# Patient Record
Sex: FEMALE | Race: BLACK OR AFRICAN AMERICAN | Employment: UNEMPLOYED | URBAN - METROPOLITAN AREA
[De-identification: names, ages, dates, MRNs, and addresses within clinical notes are randomized per-mention and may not be internally consistent; named-entity substitution may affect disease eponyms.]

---

## 2022-03-14 ENCOUNTER — APPOINTMENT (OUTPATIENT)
Dept: GENERAL RADIOLOGY | Age: 47
End: 2022-03-14
Payer: COMMERCIAL

## 2022-03-14 ENCOUNTER — APPOINTMENT (OUTPATIENT)
Dept: CT IMAGING | Age: 47
End: 2022-03-14
Payer: COMMERCIAL

## 2022-03-14 ENCOUNTER — HOSPITAL ENCOUNTER (OUTPATIENT)
Age: 47
Setting detail: OBSERVATION
Discharge: HOME | End: 2022-03-16
Attending: EMERGENCY MEDICINE | Admitting: INTERNAL MEDICINE
Payer: COMMERCIAL

## 2022-03-14 DIAGNOSIS — R07.9 ACUTE CHEST PAIN: ICD-10-CM

## 2022-03-14 DIAGNOSIS — I10 HYPERTENSION, UNSPECIFIED TYPE: ICD-10-CM

## 2022-03-14 DIAGNOSIS — E55.9 VITAMIN D DEFICIENCY: ICD-10-CM

## 2022-03-14 DIAGNOSIS — G45.9 TIA (TRANSIENT ISCHEMIC ATTACK): Primary | ICD-10-CM

## 2022-03-14 LAB
ANION GAP SERPL CALCULATED.3IONS-SCNC: 10 MMOL/L (ref 7–16)
BASOPHILS ABSOLUTE: 0.02 E9/L (ref 0–0.2)
BASOPHILS RELATIVE PERCENT: 0.4 % (ref 0–2)
BUN BLDV-MCNC: 10 MG/DL (ref 6–20)
CALCIUM SERPL-MCNC: 9.1 MG/DL (ref 8.6–10.2)
CHLORIDE BLD-SCNC: 101 MMOL/L (ref 98–107)
CO2: 27 MMOL/L (ref 22–29)
CREAT SERPL-MCNC: 0.9 MG/DL (ref 0.5–1)
EKG ATRIAL RATE: 67 BPM
EKG P AXIS: 18 DEGREES
EKG P-R INTERVAL: 194 MS
EKG Q-T INTERVAL: 404 MS
EKG QRS DURATION: 76 MS
EKG QTC CALCULATION (BAZETT): 426 MS
EKG R AXIS: 39 DEGREES
EKG T AXIS: 25 DEGREES
EKG VENTRICULAR RATE: 67 BPM
EOSINOPHILS ABSOLUTE: 0.1 E9/L (ref 0.05–0.5)
EOSINOPHILS RELATIVE PERCENT: 2.1 % (ref 0–6)
GFR AFRICAN AMERICAN: >60
GFR NON-AFRICAN AMERICAN: >60 ML/MIN/1.73
GLUCOSE BLD-MCNC: 93 MG/DL (ref 74–99)
HCG, URINE, POC: NEGATIVE
HCT VFR BLD CALC: 40.8 % (ref 34–48)
HEMOGLOBIN: 13.4 G/DL (ref 11.5–15.5)
IMMATURE GRANULOCYTES #: 0.02 E9/L
IMMATURE GRANULOCYTES %: 0.4 % (ref 0–5)
LYMPHOCYTES ABSOLUTE: 1.69 E9/L (ref 1.5–4)
LYMPHOCYTES RELATIVE PERCENT: 34.8 % (ref 20–42)
Lab: NORMAL
MCH RBC QN AUTO: 30.4 PG (ref 26–35)
MCHC RBC AUTO-ENTMCNC: 32.8 % (ref 32–34.5)
MCV RBC AUTO: 92.5 FL (ref 80–99.9)
MONOCYTES ABSOLUTE: 0.48 E9/L (ref 0.1–0.95)
MONOCYTES RELATIVE PERCENT: 9.9 % (ref 2–12)
NEGATIVE QC PASS/FAIL: NORMAL
NEUTROPHILS ABSOLUTE: 2.55 E9/L (ref 1.8–7.3)
NEUTROPHILS RELATIVE PERCENT: 52.4 % (ref 43–80)
PDW BLD-RTO: 13.4 FL (ref 11.5–15)
PLATELET # BLD: 204 E9/L (ref 130–450)
PMV BLD AUTO: 10.7 FL (ref 7–12)
POSITIVE QC PASS/FAIL: NORMAL
POTASSIUM REFLEX MAGNESIUM: 4.1 MMOL/L (ref 3.5–5)
RBC # BLD: 4.41 E12/L (ref 3.5–5.5)
SODIUM BLD-SCNC: 138 MMOL/L (ref 132–146)
TROPONIN, HIGH SENSITIVITY: <6 NG/L (ref 0–9)
WBC # BLD: 4.9 E9/L (ref 4.5–11.5)

## 2022-03-14 PROCEDURE — 80048 BASIC METABOLIC PNL TOTAL CA: CPT

## 2022-03-14 PROCEDURE — 93005 ELECTROCARDIOGRAM TRACING: CPT | Performed by: NURSE PRACTITIONER

## 2022-03-14 PROCEDURE — 85025 COMPLETE CBC W/AUTO DIFF WBC: CPT

## 2022-03-14 PROCEDURE — G0378 HOSPITAL OBSERVATION PER HR: HCPCS

## 2022-03-14 PROCEDURE — 99283 EMERGENCY DEPT VISIT LOW MDM: CPT

## 2022-03-14 PROCEDURE — 2580000003 HC RX 258: Performed by: INTERNAL MEDICINE

## 2022-03-14 PROCEDURE — 70450 CT HEAD/BRAIN W/O DYE: CPT

## 2022-03-14 PROCEDURE — 93010 ELECTROCARDIOGRAM REPORT: CPT | Performed by: INTERNAL MEDICINE

## 2022-03-14 PROCEDURE — 84484 ASSAY OF TROPONIN QUANT: CPT

## 2022-03-14 PROCEDURE — 6370000000 HC RX 637 (ALT 250 FOR IP): Performed by: INTERNAL MEDICINE

## 2022-03-14 PROCEDURE — 71046 X-RAY EXAM CHEST 2 VIEWS: CPT

## 2022-03-14 RX ORDER — ACETAMINOPHEN 325 MG/1
650 TABLET ORAL EVERY 6 HOURS PRN
Status: DISCONTINUED | OUTPATIENT
Start: 2022-03-14 | End: 2022-03-16 | Stop reason: HOSPADM

## 2022-03-14 RX ORDER — ROSUVASTATIN CALCIUM 40 MG/1
40 TABLET, COATED ORAL DAILY
COMMUNITY

## 2022-03-14 RX ORDER — ACETAMINOPHEN 650 MG/1
650 SUPPOSITORY RECTAL EVERY 6 HOURS PRN
Status: DISCONTINUED | OUTPATIENT
Start: 2022-03-14 | End: 2022-03-16 | Stop reason: HOSPADM

## 2022-03-14 RX ORDER — SODIUM CHLORIDE 0.9 % (FLUSH) 0.9 %
5-40 SYRINGE (ML) INJECTION EVERY 12 HOURS SCHEDULED
Status: DISCONTINUED | OUTPATIENT
Start: 2022-03-14 | End: 2022-03-16 | Stop reason: HOSPADM

## 2022-03-14 RX ORDER — LOSARTAN POTASSIUM 50 MG/1
100 TABLET ORAL DAILY
Status: DISCONTINUED | OUTPATIENT
Start: 2022-03-15 | End: 2022-03-16 | Stop reason: HOSPADM

## 2022-03-14 RX ORDER — SODIUM CHLORIDE 9 MG/ML
25 INJECTION, SOLUTION INTRAVENOUS PRN
Status: DISCONTINUED | OUTPATIENT
Start: 2022-03-14 | End: 2022-03-16 | Stop reason: HOSPADM

## 2022-03-14 RX ORDER — ONDANSETRON 2 MG/ML
4 INJECTION INTRAMUSCULAR; INTRAVENOUS EVERY 6 HOURS PRN
Status: DISCONTINUED | OUTPATIENT
Start: 2022-03-14 | End: 2022-03-16 | Stop reason: HOSPADM

## 2022-03-14 RX ORDER — SODIUM CHLORIDE 0.9 % (FLUSH) 0.9 %
5-40 SYRINGE (ML) INJECTION PRN
Status: DISCONTINUED | OUTPATIENT
Start: 2022-03-14 | End: 2022-03-16 | Stop reason: HOSPADM

## 2022-03-14 RX ORDER — AMLODIPINE BESYLATE 10 MG/1
10 TABLET ORAL DAILY
Status: DISCONTINUED | OUTPATIENT
Start: 2022-03-15 | End: 2022-03-15

## 2022-03-14 RX ORDER — CLOPIDOGREL BISULFATE 75 MG/1
75 TABLET ORAL DAILY
Status: DISCONTINUED | OUTPATIENT
Start: 2022-03-15 | End: 2022-03-16 | Stop reason: HOSPADM

## 2022-03-14 RX ORDER — ASPIRIN 81 MG/1
81 TABLET ORAL DAILY
COMMUNITY

## 2022-03-14 RX ORDER — ONDANSETRON 4 MG/1
4 TABLET, ORALLY DISINTEGRATING ORAL EVERY 8 HOURS PRN
Status: DISCONTINUED | OUTPATIENT
Start: 2022-03-14 | End: 2022-03-16 | Stop reason: HOSPADM

## 2022-03-14 RX ORDER — ASPIRIN 81 MG/1
81 TABLET ORAL DAILY
Status: DISCONTINUED | OUTPATIENT
Start: 2022-03-15 | End: 2022-03-16 | Stop reason: HOSPADM

## 2022-03-14 RX ORDER — ROSUVASTATIN CALCIUM 20 MG/1
40 TABLET, COATED ORAL DAILY
Status: DISCONTINUED | OUTPATIENT
Start: 2022-03-15 | End: 2022-03-16 | Stop reason: HOSPADM

## 2022-03-14 RX ORDER — TRAZODONE HYDROCHLORIDE 100 MG/1
100 TABLET ORAL NIGHTLY
COMMUNITY

## 2022-03-14 RX ORDER — POLYETHYLENE GLYCOL 3350 17 G/17G
17 POWDER, FOR SOLUTION ORAL DAILY PRN
Status: DISCONTINUED | OUTPATIENT
Start: 2022-03-14 | End: 2022-03-16 | Stop reason: HOSPADM

## 2022-03-14 RX ADMIN — Medication 10 ML: at 23:09

## 2022-03-14 RX ADMIN — ACETAMINOPHEN 650 MG: 325 TABLET ORAL at 23:42

## 2022-03-14 ASSESSMENT — PAIN DESCRIPTION - DESCRIPTORS: DESCRIPTORS: ACHING;DISCOMFORT;HEAVINESS

## 2022-03-14 ASSESSMENT — PAIN DESCRIPTION - ONSET: ONSET: GRADUAL

## 2022-03-14 ASSESSMENT — PAIN DESCRIPTION - LOCATION: LOCATION: CHEST

## 2022-03-14 ASSESSMENT — PAIN SCALES - GENERAL
PAINLEVEL_OUTOF10: 4
PAINLEVEL_OUTOF10: 4
PAINLEVEL_OUTOF10: 5

## 2022-03-14 ASSESSMENT — PAIN DESCRIPTION - ORIENTATION: ORIENTATION: MID

## 2022-03-14 ASSESSMENT — PAIN DESCRIPTION - FREQUENCY: FREQUENCY: CONTINUOUS

## 2022-03-14 ASSESSMENT — PAIN DESCRIPTION - PAIN TYPE: TYPE: ACUTE PAIN

## 2022-03-14 NOTE — ED PROVIDER NOTES
HPI     Patient is a 48 y.o. female presents with a chief complaint of chest pain and extremity weakness  This has been occurring for the past 2 weeks. Patient states that it gets better with nothing. Patient states that it gets worse with nothing. Patient states that it is mild in severity. Patient states it was gradual in onset. Patient has a history of a stroke in 2013 which left her with left sided defecits. Over the past two weeks it has gradually been getting worse. This morning her left leg gave out on her and she was evaluated by her PCP. She was told to come here for a head CT. Patient has since been able to ambulate without difficulty. She is taking aspirin and Plavix. Patient states her chest pain is substernal with no radiation. She rates it as 4/10 in severity. She feels overall fatigued. Review of Systems   Constitutional: Positive for fatigue. Negative for chills and fever. HENT: Negative for ear pain and sore throat. Eyes: Negative for pain. Respiratory: Negative for shortness of breath. Cardiovascular: Positive for chest pain. Gastrointestinal: Negative for abdominal pain, diarrhea, nausea and vomiting. Genitourinary: Negative for flank pain. Musculoskeletal: Negative for back pain and neck pain. Skin: Negative for rash. Neurological: Positive for weakness. Negative for headaches. Psychiatric/Behavioral: Negative for behavioral problems. The patient is not nervous/anxious. Physical Exam  Constitutional:       General: She is not in acute distress. Appearance: Normal appearance. She is not ill-appearing. HENT:      Head: Normocephalic and atraumatic. Right Ear: External ear normal.      Left Ear: External ear normal.      Nose: Nose normal.      Mouth/Throat:      Mouth: Mucous membranes are moist.      Pharynx: No oropharyngeal exudate. Eyes:      Extraocular Movements: Extraocular movements intact.       Conjunctiva/sclera: Conjunctivae normal.      Pupils: Pupils are equal, round, and reactive to light. Cardiovascular:      Rate and Rhythm: Normal rate. Pulses: Normal pulses. Pulmonary:      Effort: No respiratory distress. Breath sounds: Normal breath sounds. Abdominal:      Palpations: Abdomen is soft. Tenderness: There is no abdominal tenderness. There is no guarding or rebound. Musculoskeletal:         General: No deformity or signs of injury. Cervical back: Neck supple. No rigidity. Skin:     General: Skin is warm and dry. Capillary Refill: Capillary refill takes less than 2 seconds. Neurological:      General: No focal deficit present. Mental Status: She is alert and oriented to person, place, and time. Mental status is at baseline. Sensory: No sensory deficit. Motor: No weakness. Coordination: Coordination normal.      Gait: Gait normal.      Comments: Slightly more weak on the left upper and lower extremities when compared to the right side, although still 5/5 muscle strength. Psychiatric:         Mood and Affect: Mood normal.          Procedures     [facility]     ED Course as of 03/15/22 0138   Mon Mar 14, 2022   7858 Spoke with Dr. Felisha Ngo who agreed to admit the patient. [TO]      ED Course User Index  [TO] Gerardo Barrios DO      Patient is a 48 y.o. female presenting with chest pain and extremity weakness. Patient has a history of previous stroke and is already on aspirin and Plavix. She appeared in no acute distress with stable vital signs. Neuro exam is largely unremarkable. History concerning for TIA. Troponin was negative and EKG showed no STEMI. Her blood work was within normal limits. CT head and chest x-ray showed no acute process. As patient has higher risk for TIA trauma, the decision was made to admit the patient for observation. Case was discussed with Dr. Thelma Lerma who agreed to admit the patient.       Patient was seen and evaluated by myself and my attending Jose A Campos MD. Assessment and Plan discussed with attending provider, please see attestation for final plan of care. This note was done using dictation software and there may be some grammatical errors associated with this. Jonas Carroll DO     ED Course as of 03/15/22 0138   Mon Mar 14, 2022   1858 Spoke with Dr. Nicki Novak who agreed to admit the patient. [TO]      ED Course User Index  [TO] Charissekelsey Esquivel DO       --------------------------------------------- PAST HISTORY ---------------------------------------------  Past Medical History:  has a past medical history of Chest pain, CVA (cerebral infarction), Hypercholesterolemia, Hypertension, and Migraine. Past Surgical History:  has a past surgical history that includes Hysterectomy; Foot surgery (Right); Echo Complete (12/26/2013); ECHO Transesophageal (12/30/2013); transesophageal echocardiogram (12/30/2013); and Tubal ligation. Social History:  reports that she has never smoked. She has never used smokeless tobacco. She reports that she does not drink alcohol and does not use drugs. Family History: family history includes Heart Disease in her mother; Stroke in her mother. The patients home medications have been reviewed. Allergies: Patient has no known allergies.     -------------------------------------------------- RESULTS -------------------------------------------------    LABS:  Results for orders placed or performed during the hospital encounter of 03/14/22   CBC with Auto Differential   Result Value Ref Range    WBC 4.9 4.5 - 11.5 E9/L    RBC 4.41 3.50 - 5.50 E12/L    Hemoglobin 13.4 11.5 - 15.5 g/dL    Hematocrit 40.8 34.0 - 48.0 %    MCV 92.5 80.0 - 99.9 fL    MCH 30.4 26.0 - 35.0 pg    MCHC 32.8 32.0 - 34.5 %    RDW 13.4 11.5 - 15.0 fL    Platelets 556 315 - 478 E9/L    MPV 10.7 7.0 - 12.0 fL    Neutrophils % 52.4 43.0 - 80.0 %    Immature Granulocytes % 0.4 0.0 - 5.0 %    Lymphocytes % 34.8 20.0 - 42.0 %    Monocytes % 9.9 2.0 - 12.0 %    Eosinophils % 2.1 0.0 - 6.0 %    Basophils % 0.4 0.0 - 2.0 %    Neutrophils Absolute 2.55 1.80 - 7.30 E9/L    Immature Granulocytes # 0.02 E9/L    Lymphocytes Absolute 1.69 1.50 - 4.00 E9/L    Monocytes Absolute 0.48 0.10 - 0.95 E9/L    Eosinophils Absolute 0.10 0.05 - 0.50 E9/L    Basophils Absolute 0.02 0.00 - 0.20 W2/V   Basic Metabolic Panel w/ Reflex to MG   Result Value Ref Range    Sodium 138 132 - 146 mmol/L    Potassium reflex Magnesium 4.1 3.5 - 5.0 mmol/L    Chloride 101 98 - 107 mmol/L    CO2 27 22 - 29 mmol/L    Anion Gap 10 7 - 16 mmol/L    Glucose 93 74 - 99 mg/dL    BUN 10 6 - 20 mg/dL    CREATININE 0.9 0.5 - 1.0 mg/dL    GFR Non-African American >60 >=60 mL/min/1.73    GFR African American >60     Calcium 9.1 8.6 - 10.2 mg/dL   Troponin   Result Value Ref Range    Troponin, High Sensitivity <6 0 - 9 ng/L   POC Pregnancy Urine   Result Value Ref Range    HCG, Urine, POC Negative Negative    Lot Number [id number]     Positive QC Pass/Fail Pass     Negative QC Pass/Fail Pass    EKG 12 Lead   Result Value Ref Range    Ventricular Rate 67 BPM    Atrial Rate 67 BPM    P-R Interval 194 ms    QRS Duration 76 ms    Q-T Interval 404 ms    QTc Calculation (Bazett) 426 ms    P Axis 18 degrees    R Axis 39 degrees    T Axis 25 degrees       RADIOLOGY:  CT Head WO Contrast   Final Result   No acute intracranial abnormality. XR CHEST (2 VW)   Final Result   No acute process. US CAROTID ARTERY BILATERAL    (Results Pending)   MRI BRAIN WO CONTRAST    (Results Pending)           ------------------------- NURSING NOTES AND VITALS REVIEWED ---------------------------  Date / Time Roomed:  3/14/2022  5:45 PM  ED Bed Assignment:  [id number]    The nursing notes within the ED encounter and vital signs as below have been reviewed.      Patient Vitals for the past 24 hrs:   BP Temp Temp src Pulse Resp SpO2 Height Weight   03/15/22 0007 (!) 140/83 97 °F (36.1 °C) Temporal 66 18 100 % -- --

## 2022-03-14 NOTE — ED NOTES
Department of Emergency Medicine  FIRST PROVIDER TRIAGE NOTE             Independent MLP           3/14/22  1:00 PM EDT    Date of Encounter: 3/14/22   MRN: [id number]      HPI: Yanira Hill is a 48 y.o. female who presents to the ED for Chest Pain (for a few days) and Extremity Weakness (b/l legs weakness, \"just not right\"  since friday  went to see Dr Gabriela Chaudhary, who sent her here today )     Patient states she does have been having chest pain for the last several days. She states for the last week she has been having bilateral leg weakness which is worse on the left. Was sent in today for a CT of her head due to increased weakness. Has a history of a stroke. Last known well was 1 week ago. On plavix patient states she fell 2 weeks ago and did hit her head    ROS: Negative for sob or fever. PE: Gen Appearance/Constitutional: alert  Musculoskeletal: moves all extremities x 4     Initial Plan of Care: All treatment areas with department are currently occupied. Plan to order/Initiate the following while awaiting opening in ED: labs, EKG and imaging studies.   Initiate Treatment-Testing, Proceed toTreatment Area When Bed Available for ED Attending/MLP to Continue Care    Electronically signed by SARA Pink CNP   DD: 3/14/22         SARA Pink CNP  03/14/22 [id number]

## 2022-03-15 ENCOUNTER — APPOINTMENT (OUTPATIENT)
Dept: ULTRASOUND IMAGING | Age: 47
End: 2022-03-15
Payer: COMMERCIAL

## 2022-03-15 ENCOUNTER — APPOINTMENT (OUTPATIENT)
Dept: MRI IMAGING | Age: 47
End: 2022-03-15
Payer: COMMERCIAL

## 2022-03-15 LAB
ANION GAP SERPL CALCULATED.3IONS-SCNC: 13 MMOL/L (ref 7–16)
BACTERIA: ABNORMAL /HPF
BILIRUBIN URINE: NEGATIVE
BLOOD, URINE: ABNORMAL
BUN BLDV-MCNC: 11 MG/DL (ref 6–20)
C-REACTIVE PROTEIN: 0.3 MG/DL (ref 0–0.4)
CALCIUM SERPL-MCNC: 8.9 MG/DL (ref 8.6–10.2)
CHLORIDE BLD-SCNC: 102 MMOL/L (ref 98–107)
CHOLESTEROL, TOTAL: 211 MG/DL (ref 0–199)
CLARITY: CLEAR
CO2: 26 MMOL/L (ref 22–29)
COLOR: YELLOW
CREAT SERPL-MCNC: 0.8 MG/DL (ref 0.5–1)
FOLATE: 15.4 NG/ML (ref 4.8–24.2)
GFR AFRICAN AMERICAN: >60
GFR NON-AFRICAN AMERICAN: >60 ML/MIN/1.73
GLUCOSE BLD-MCNC: 99 MG/DL (ref 74–99)
GLUCOSE URINE: NEGATIVE MG/DL
HBA1C MFR BLD: 5.9 % (ref 4–5.6)
HDLC SERPL-MCNC: 36 MG/DL
KETONES, URINE: NEGATIVE MG/DL
LDL CHOLESTEROL CALCULATED: 131 MG/DL (ref 0–99)
LEUKOCYTE ESTERASE, URINE: NEGATIVE
MAGNESIUM: 2.1 MG/DL (ref 1.6–2.6)
NITRITE, URINE: NEGATIVE
PH UA: 7.5 (ref 5–9)
PHOSPHORUS: 4.2 MG/DL (ref 2.5–4.5)
POTASSIUM SERPL-SCNC: 3.6 MMOL/L (ref 3.5–5)
PROTEIN UA: NEGATIVE MG/DL
RBC UA: ABNORMAL /HPF (ref 0–2)
SEDIMENTATION RATE, ERYTHROCYTE: 11 MM/HR (ref 0–20)
SODIUM BLD-SCNC: 141 MMOL/L (ref 132–146)
SPECIFIC GRAVITY UA: 1.01 (ref 1–1.03)
TOTAL CK: 98 U/L (ref 20–180)
TRIGL SERPL-MCNC: 222 MG/DL (ref 0–149)
TSH SERPL DL<=0.05 MIU/L-ACNC: 0.91 UIU/ML (ref 0.27–4.2)
UROBILINOGEN, URINE: 0.2 E.U./DL
VITAMIN B-12: 513 PG/ML (ref 211–946)
VITAMIN D 25-HYDROXY: 11 NG/ML (ref 30–100)
VLDLC SERPL CALC-MCNC: 44 MG/DL
WBC UA: ABNORMAL /HPF (ref 0–5)

## 2022-03-15 PROCEDURE — 70551 MRI BRAIN STEM W/O DYE: CPT

## 2022-03-15 PROCEDURE — 99222 1ST HOSP IP/OBS MODERATE 55: CPT | Performed by: PSYCHIATRY & NEUROLOGY

## 2022-03-15 PROCEDURE — 93880 EXTRACRANIAL BILAT STUDY: CPT

## 2022-03-15 PROCEDURE — 82550 ASSAY OF CK (CPK): CPT

## 2022-03-15 PROCEDURE — 85651 RBC SED RATE NONAUTOMATED: CPT

## 2022-03-15 PROCEDURE — 83036 HEMOGLOBIN GLYCOSYLATED A1C: CPT

## 2022-03-15 PROCEDURE — 6370000000 HC RX 637 (ALT 250 FOR IP): Performed by: INTERNAL MEDICINE

## 2022-03-15 PROCEDURE — 96374 THER/PROPH/DIAG INJ IV PUSH: CPT

## 2022-03-15 PROCEDURE — 96372 THER/PROPH/DIAG INJ SC/IM: CPT

## 2022-03-15 PROCEDURE — G0378 HOSPITAL OBSERVATION PER HR: HCPCS

## 2022-03-15 PROCEDURE — 6360000002 HC RX W HCPCS

## 2022-03-15 PROCEDURE — 6360000002 HC RX W HCPCS: Performed by: INTERNAL MEDICINE

## 2022-03-15 PROCEDURE — 2580000003 HC RX 258: Performed by: INTERNAL MEDICINE

## 2022-03-15 PROCEDURE — 83735 ASSAY OF MAGNESIUM: CPT

## 2022-03-15 PROCEDURE — 93880 EXTRACRANIAL BILAT STUDY: CPT | Performed by: RADIOLOGY

## 2022-03-15 PROCEDURE — 84443 ASSAY THYROID STIM HORMONE: CPT

## 2022-03-15 PROCEDURE — 6370000000 HC RX 637 (ALT 250 FOR IP)

## 2022-03-15 PROCEDURE — 36415 COLL VENOUS BLD VENIPUNCTURE: CPT

## 2022-03-15 PROCEDURE — 80048 BASIC METABOLIC PNL TOTAL CA: CPT

## 2022-03-15 PROCEDURE — 86140 C-REACTIVE PROTEIN: CPT

## 2022-03-15 PROCEDURE — 82746 ASSAY OF FOLIC ACID SERUM: CPT

## 2022-03-15 PROCEDURE — 82306 VITAMIN D 25 HYDROXY: CPT

## 2022-03-15 PROCEDURE — 82607 VITAMIN B-12: CPT

## 2022-03-15 PROCEDURE — 80061 LIPID PANEL: CPT

## 2022-03-15 PROCEDURE — 81001 URINALYSIS AUTO W/SCOPE: CPT

## 2022-03-15 PROCEDURE — 84100 ASSAY OF PHOSPHORUS: CPT

## 2022-03-15 RX ORDER — ATORVASTATIN CALCIUM 40 MG/1
40 TABLET, FILM COATED ORAL NIGHTLY
Status: DISCONTINUED | OUTPATIENT
Start: 2022-03-15 | End: 2022-03-15

## 2022-03-15 RX ORDER — EZETIMIBE 10 MG/1
10 TABLET ORAL NIGHTLY
Status: DISCONTINUED | OUTPATIENT
Start: 2022-03-15 | End: 2022-03-16 | Stop reason: HOSPADM

## 2022-03-15 RX ORDER — GABAPENTIN 300 MG/1
300 CAPSULE ORAL 3 TIMES DAILY
Status: DISCONTINUED | OUTPATIENT
Start: 2022-03-15 | End: 2022-03-16 | Stop reason: HOSPADM

## 2022-03-15 RX ORDER — ERGOCALCIFEROL 1.25 MG/1
50000 CAPSULE ORAL WEEKLY
Status: DISCONTINUED | OUTPATIENT
Start: 2022-03-15 | End: 2022-03-16 | Stop reason: HOSPADM

## 2022-03-15 RX ORDER — KETOROLAC TROMETHAMINE 30 MG/ML
15 INJECTION, SOLUTION INTRAMUSCULAR; INTRAVENOUS ONCE
Status: COMPLETED | OUTPATIENT
Start: 2022-03-15 | End: 2022-03-15

## 2022-03-15 RX ORDER — TRAZODONE HYDROCHLORIDE 50 MG/1
100 TABLET ORAL NIGHTLY
Status: DISCONTINUED | OUTPATIENT
Start: 2022-03-15 | End: 2022-03-16 | Stop reason: HOSPADM

## 2022-03-15 RX ORDER — METOPROLOL TARTRATE 50 MG/1
50 TABLET, FILM COATED ORAL 2 TIMES DAILY
Status: DISCONTINUED | OUTPATIENT
Start: 2022-03-15 | End: 2022-03-16 | Stop reason: HOSPADM

## 2022-03-15 RX ADMIN — ACETAMINOPHEN 650 MG: 325 TABLET ORAL at 16:35

## 2022-03-15 RX ADMIN — GABAPENTIN 300 MG: 300 CAPSULE ORAL at 21:24

## 2022-03-15 RX ADMIN — METOPROLOL TARTRATE 50 MG: 50 TABLET, FILM COATED ORAL at 10:05

## 2022-03-15 RX ADMIN — ENOXAPARIN SODIUM 40 MG: 100 INJECTION SUBCUTANEOUS at 09:56

## 2022-03-15 RX ADMIN — Medication 10 ML: at 09:57

## 2022-03-15 RX ADMIN — Medication 10 ML: at 22:07

## 2022-03-15 RX ADMIN — TRAZODONE HYDROCHLORIDE 100 MG: 50 TABLET ORAL at 00:34

## 2022-03-15 RX ADMIN — ASPIRIN 81 MG: 81 TABLET, COATED ORAL at 09:56

## 2022-03-15 RX ADMIN — TRAZODONE HYDROCHLORIDE 100 MG: 50 TABLET ORAL at 21:24

## 2022-03-15 RX ADMIN — GABAPENTIN 300 MG: 300 CAPSULE ORAL at 10:05

## 2022-03-15 RX ADMIN — KETOROLAC TROMETHAMINE 15 MG: 30 INJECTION, SOLUTION INTRAMUSCULAR; INTRAVENOUS at 14:40

## 2022-03-15 RX ADMIN — LOSARTAN POTASSIUM 100 MG: 50 TABLET, FILM COATED ORAL at 09:56

## 2022-03-15 RX ADMIN — ERGOCALCIFEROL 50000 UNITS: 1.25 CAPSULE ORAL at 10:05

## 2022-03-15 RX ADMIN — ACETAMINOPHEN 650 MG: 325 TABLET ORAL at 09:56

## 2022-03-15 RX ADMIN — EZETIMIBE 10 MG: 10 TABLET ORAL at 21:25

## 2022-03-15 RX ADMIN — ROSUVASTATIN 40 MG: 20 TABLET, FILM COATED ORAL at 09:56

## 2022-03-15 RX ADMIN — METOPROLOL TARTRATE 50 MG: 50 TABLET, FILM COATED ORAL at 21:25

## 2022-03-15 RX ADMIN — GABAPENTIN 300 MG: 300 CAPSULE ORAL at 14:41

## 2022-03-15 RX ADMIN — CLOPIDOGREL BISULFATE 75 MG: 75 TABLET ORAL at 09:56

## 2022-03-15 ASSESSMENT — PAIN SCALES - GENERAL
PAINLEVEL_OUTOF10: 6
PAINLEVEL_OUTOF10: 5
PAINLEVEL_OUTOF10: 8
PAINLEVEL_OUTOF10: 6
PAINLEVEL_OUTOF10: 6
PAINLEVEL_OUTOF10: 0
PAINLEVEL_OUTOF10: 5
PAINLEVEL_OUTOF10: 5
PAINLEVEL_OUTOF10: 4

## 2022-03-15 ASSESSMENT — PAIN DESCRIPTION - PAIN TYPE
TYPE: ACUTE PAIN
TYPE: ACUTE PAIN;CHRONIC PAIN
TYPE: ACUTE PAIN
TYPE: ACUTE PAIN

## 2022-03-15 ASSESSMENT — PAIN DESCRIPTION - FREQUENCY
FREQUENCY: CONTINUOUS

## 2022-03-15 ASSESSMENT — PAIN DESCRIPTION - DESCRIPTORS
DESCRIPTORS: ACHING;CONSTANT;DISCOMFORT
DESCRIPTORS: DULL;DISCOMFORT;CONSTANT
DESCRIPTORS: DULL;ACHING;CONSTANT
DESCRIPTORS: CONSTANT;ACHING;DULL
DESCRIPTORS: CRUSHING;DISCOMFORT;ACHING
DESCRIPTORS: ACHING;CONSTANT;DISCOMFORT
DESCRIPTORS: ACHING;DISCOMFORT;HEAVINESS

## 2022-03-15 ASSESSMENT — PAIN DESCRIPTION - ONSET
ONSET: ON-GOING
ONSET: GRADUAL
ONSET: ON-GOING
ONSET: ON-GOING

## 2022-03-15 ASSESSMENT — ENCOUNTER SYMPTOMS
DIARRHEA: 0
BACK PAIN: 0
SORE THROAT: 0
VOMITING: 0
EYE PAIN: 0
ABDOMINAL PAIN: 0
SHORTNESS OF BREATH: 0
NAUSEA: 0

## 2022-03-15 ASSESSMENT — PAIN DESCRIPTION - LOCATION
LOCATION: ARM;CHEST
LOCATION: ARM;HEAD
LOCATION: CHEST
LOCATION: ARM;HEAD;LEG
LOCATION: HEAD;ARM
LOCATION: ARM;HEAD
LOCATION: ARM;HEAD;LEG

## 2022-03-15 ASSESSMENT — PAIN DESCRIPTION - PROGRESSION
CLINICAL_PROGRESSION: GRADUALLY WORSENING
CLINICAL_PROGRESSION: NOT CHANGED
CLINICAL_PROGRESSION: GRADUALLY WORSENING
CLINICAL_PROGRESSION: NOT CHANGED
CLINICAL_PROGRESSION: NOT CHANGED
CLINICAL_PROGRESSION: GRADUALLY WORSENING
CLINICAL_PROGRESSION: GRADUALLY WORSENING

## 2022-03-15 ASSESSMENT — PAIN DESCRIPTION - ORIENTATION
ORIENTATION: MID
ORIENTATION: RIGHT

## 2022-03-15 NOTE — PROGRESS NOTES
Physical Therapy    PT orders received and chart reviewed to attempt eval. Pt observed amb independently in room. PT screen complete as pt declines need for skilled therapy interventions. PT will discontinue at this time. Thank you.     Berenice Davalos, PT, DPT  [id number]

## 2022-03-15 NOTE — PROGRESS NOTES
Per Internal med bedside swallow was done with water, applesauce, and pudding. Patient passed swallow with all.

## 2022-03-15 NOTE — CARE COORDINATION
Transition of care: Neurology consulted. Met with pt in room. Pt lives with her , Giovanni Salcedo, in a 2 story home. Has 2 stairs to enter home with hand rails on both sides. Her bedroom is on the 2nd level. Independent with ADLs. Her  provides transportation. Hx of aru at [facility]. Plan is to return home. Denies any needs for home at present. PCP is Dr. Slick Fonseca and pharmacy is [facility] on [address]. Sw/cm will follow.

## 2022-03-15 NOTE — PROGRESS NOTES
[address] [facility]  Internal Medicine Residency / [address]    Attending Physician Statement  I have discussed the case, including pertinent history and exam findings with the resident and the team.  I have seen and examined the patient and the key elements of the encounter have been performed by me. I agree with the assessment, plan and orders as documented by the resident. Able to use upper extremities and bilateral hand function normal  A&O  Mobile to restroom with help this AM  Dizziness she describes is NOT vertigo    And describes her gait as abnormal  Has Urge /stress incontinence chronically  LLE positive Babinski, old?from previous CVA  No better on Prednisone for PMR x one week  Back on Crestor  Vitamin D extremely low   Plan: MRI brain           Neuro consult, PT/OT  Remainder of medical problems as per resident note.       Rodo Vasquez MD [facility] SPECIALTY [facility]  Internal Medicine Residency Faculty

## 2022-03-15 NOTE — H&P
[facility]  Internal Medicine Residency Program  History and Physical    Patient:  Mata Rojas 48 y.o. female MRN: [id number]     Date of Service: 3/14/2022    Hospital Day: 1      Chief complaint: had concerns including Chest Pain (for a few days) and Extremity Weakness (b/l legs weakness, \"just not right\"  since friday  went to see Dr Libertad Mayers, who sent her here today, has been falling, hit head yesterday ). History of Present Illness   The patient is a 48 y.o. female with a past medical history of stroke, hypertension, hyperlipidemia presented to the office because of bilateral lower extremity weakness. Patient mentions that today he was in the grocery store that she felt her lower extremities were so heavy and weak. She went and saw her neurologist, Dr. Michelle Mora and he recommended her to come to the hospital.     Patient has a history of a stroke back in 2013 and she has been taking aspirin and Plavix for it. In the ED patient was hemodynamically stable, and afebrile. She was alert and oriented x3. Head CT did not show any acute abnormalities. All the lab studies were within normal limits. She was admitted to the floor for evaluation of possible TIA. Past Medical History:      Diagnosis Date    Chest pain 7-    exercise nuclear stress test    CVA (cerebral infarction)     Hypercholesterolemia 2/27/2014    Hypertension     Migraine        Past Surgical History:        Procedure Laterality Date    ECHO COMPLETE  12/26/2013         ECHOCARDIOGRAM TRANSESOPHAGEAL  12/30/2013         FOOT SURGERY Right     HYSTERECTOMY      TRANSESOPHAGEAL ECHOCARDIOGRAM  12/30/2013    Dr Bennet Canavan         Medications Prior to Admission:    Prior to Admission medications    Medication Sig Start Date End Date Taking?  Authorizing Provider   rosuvastatin (CRESTOR) 40 MG tablet Take 40 mg by mouth daily   Yes Historical Provider, MD   aspirin 81 MG EC tablet Take 81 mg by mouth daily   Yes Historical Provider, MD   traZODone (DESYREL) 100 MG tablet Take 100 mg by mouth nightly   Yes Historical Provider, MD   losartan (COZAAR) 100 MG tablet Take 100 mg by mouth daily   Yes Historical Provider, MD   gabapentin (NEURONTIN) 300 MG capsule Take 300 mg by mouth 3 times daily. Yes Historical Provider, MD   clopidogrel (PLAVIX) 75 MG tablet Take 1 tablet by mouth daily. 7/10/14  Yes Ramiro Burr MD   ezetimibe (ZETIA) 10 MG tablet Take 1 tablet by mouth daily. 7/10/14  Yes Ramiro Burr MD   potassium chloride SA (K-DUR;KLOR-CON M) 10 MEQ tablet Take 1 tablet by mouth 2 times daily (with meals). 12/31/13  Yes Rajesh Gutierrez DO   metoprolol (LOPRESSOR) 50 MG tablet Take 1 tablet by mouth 2 times daily. 12/27/13  Yes Lakeisha Bunch MD       Allergies:  Patient has no known allergies. Social History:   TOBACCO:   reports that she has never smoked. She has never used smokeless tobacco.  ETOH:   reports no history of alcohol use. Family History:       Problem Relation Age of Onset    Stroke Mother     Heart Disease Mother        REVIEW OF SYSTEMS:    · Constitutional: No fever, no chills, no change in weight; good appetite  · HEENT: Occasional blurred vision, no ear problems, no sore throat, no rhinorrhea. · Respiratory: No cough, no sputum production, no pleuritic chest pain, no shortness of breath  · Cardiology: Occasional chest pain, no dyspnea on exertion, no paroxysmal nocturnal dyspnea, no orthopnea, no palpitation, no leg swelling. · Gastroenterology: No dysphagia, no reflux; no abdominal pain, no nausea or vomiting; no constipation or diarrhea.  No hematochezia   · Genitourinary: No dysuria, no frequency, hesitancy; no hematuria  · Musculoskeletal: no joint pain, no myalgia, no change in range of movement  · Neurology:  weakness in extremities, no slurred speech, no double vision, no tingling or numbness sensation  · Endocrinology: no temperature intolerance, no polyphagia, polydipsia or polyuria  · Hematology: no increased bleeding, no bruising, no lymphadenopathy  · Skin: no skin changes noticed by patient  · Psychology: no depressed mood, no suicidal ideation    Physical Exam   · Vitals: BP (!) 143/100   Pulse 70   Temp 98.5 °F (36.9 °C)   Resp 19   Wt 175 lb (79.4 kg)   LMP 08/02/2012   SpO2 99%   BMI 30.04 kg/m²     · General Appearance: alert and oriented to person, place and time, well-developed and well-nourished, in no acute distress  · Skin: warm and dry, no rash or erythema  · Head: normocephalic and atraumatic  · Eyes: pupils equal, round, and reactive to light, extraocular eye movements intact, conjunctivae normal  · ENT: hearing grossly normal bilaterally  · Neck: neck supple and non tender without mass, no thyromegaly or thyroid nodules, no cervical lymphadenopathy   · Pulmonary/Chest: clear to auscultation bilaterally- no wheezes, rales or rhonchi, normal air movement, no respiratory distress  · Cardiovascular: normal rate, normal S1 and S2, no gallops, intact distal pulses and no carotid bruits  · Abdomen: soft, non-tender, non-distended, normal bowel sounds, no masses or organomegaly  · Extremities: no cyanosis and no clubbing  · Musculoskeletal: normal range of motion, no joint swelling, deformity or tenderness  · Neurologic: reflexes normal and symmetric, gait and coordination normal, speech normal, sensation to light touch intact and muscle strength normal   Labs and Imaging Studies   Basic Labs  Recent Labs     03/14/22  1303      K 4.1      CO2 27   BUN 10   CREATININE 0.9   GLUCOSE 93   CALCIUM 9.1       Recent Labs     03/14/22  1303   WBC 4.9   RBC 4.41   HGB 13.4   HCT 40.8   MCV 92.5   MCH 30.4   MCHC 32.8   RDW 13.4      MPV 10.7         Imaging Studies:  CT Head WO Contrast   Final Result   No acute intracranial abnormality. XR CHEST (2 VW)   Final Result   No acute process.               EKG: normal sinus rhythm.     Resident's Assessment and Plan     Assessment and Plan:    Lower extremity weakness possibly secondary to TIA  Patient subjectively reported lower extremity weakness which lasted for few hours  Patient still feels that the lower extremities are heavy  Neurological examination did not elicit any neurological deficits  Head CT was negative for any acute abnormality  NIHs score: 0    Plan  Echocardiogram  Carotid ultrasound  Brain MRI  Lipid panel  Hemoglobin A1c  Bed swallow test  Consult neurology, appreciate the input  Continue aspirin and Plavix    History of a stroke, 2013  On aspirin and Plavix at home  Resume home medications    History of hypertension  On metoprolol and Cozaar at home  Resume the home medication     History of hyperlipidemia  On statin and ezetimibe at home  Continue the statin    PT/OT evaluation: Consulted  DVT prophylaxis/ GI prophylaxis:   Disposition: admit to floor    Ericka Stone MD, PGY-1  Attending physician: Dr. Trevon Olivia No cyanosis, no pallor, no rash

## 2022-03-15 NOTE — PROGRESS NOTES
bilaterally  · Heart: regular rate and rhythm and S1, S2 normal  · Abdomen: soft, non-tender; bowel sounds normal; no masses,  no organomegaly  · Extremities:  no edema, redness or tenderness in the calves or thighs  · Musculokeletal: No joint swelling, no muscle tenderness. ROM normal in all joints of extremities.    · Neurologic: Mental status: Alert, oriented, thought content appropriate, 5/5 strength in right upper & lower extremities, 4/5 strength in left upper & lower extremities, 2+ reflexes, sensation intact globally, + Babinski sign, diminished color vision  Subject  Pertinent Labs & Imaging Studies   lillian  CBC:   Lab Results   Component Value Date    WBC 4.9 03/14/2022    RBC 4.41 03/14/2022    HGB 13.4 03/14/2022    HCT 40.8 03/14/2022    MCV 92.5 03/14/2022    MCH 30.4 03/14/2022    MCHC 32.8 03/14/2022    RDW 13.4 03/14/2022     03/14/2022    MPV 10.7 03/14/2022     CBC with Differential:    Lab Results   Component Value Date    WBC 4.9 03/14/2022    RBC 4.41 03/14/2022    HGB 13.4 03/14/2022    HCT 40.8 03/14/2022     03/14/2022    MCV 92.5 03/14/2022    MCH 30.4 03/14/2022    MCHC 32.8 03/14/2022    RDW 13.4 03/14/2022    SEGSPCT 64 12/28/2013    LYMPHOPCT 34.8 03/14/2022    MONOPCT 9.9 03/14/2022    BASOPCT 0.4 03/14/2022    MONOSABS 0.48 03/14/2022    LYMPHSABS 1.69 03/14/2022    EOSABS 0.10 03/14/2022    BASOSABS 0.02 03/14/2022     WBC:    Lab Results   Component Value Date    WBC 4.9 03/14/2022     Platelets:    Lab Results   Component Value Date     03/14/2022     Hemoglobin/Hematocrit:    Lab Results   Component Value Date    HGB 13.4 03/14/2022    HCT 40.8 03/14/2022     CMP:    Lab Results   Component Value Date     03/15/2022    K 3.6 03/15/2022    K 4.1 03/14/2022     03/15/2022    CO2 26 03/15/2022    BUN 11 03/15/2022    CREATININE 0.8 03/15/2022    GFRAA >60 03/15/2022    LABGLOM >60 03/15/2022    GLUCOSE 99 03/15/2022    PROT 7.2 12/28/2013    LABALBU 3.9 12/28/2013    CALCIUM 8.9 03/15/2022    BILITOT 0.3 12/28/2013    ALKPHOS 64 12/28/2013    AST 15 12/28/2013    ALT 16 12/28/2013     BMP:    Lab Results   Component Value Date     03/15/2022    K 3.6 03/15/2022    K 4.1 03/14/2022     03/15/2022    CO2 26 03/15/2022    BUN 11 03/15/2022    LABALBU 3.9 12/28/2013    CREATININE 0.8 03/15/2022    CALCIUM 8.9 03/15/2022    GFRAA >60 03/15/2022    LABGLOM >60 03/15/2022    GLUCOSE 99 03/15/2022     Sodium:    Lab Results   Component Value Date     03/15/2022     Potassium:    Lab Results   Component Value Date    K 3.6 03/15/2022    K 4.1 03/14/2022     BUN/Creatinine:    Lab Results   Component Value Date    BUN 11 03/15/2022    CREATININE 0.8 03/15/2022     Calcium:    Lab Results   Component Value Date    CALCIUM 8.9 03/15/2022     Ionized Calcium:  No results found for: IONCA  Magnesium:    Lab Results   Component Value Date    MG 2.1 03/15/2022     Phosphorus:    Lab Results   Component Value Date    PHOS 4.2 03/15/2022         CT Head WO Contrast   Final Result   No acute intracranial abnormality. XR CHEST (2 VW)   Final Result   No acute process. US CAROTID ARTERY BILATERAL    (Results Pending)   MRI BRAIN WO CONTRAST    (Results Pending)        Resident's Assessment and Plan     Assessment and Plan:    1. Neuro  a. Bilateral Upper & Lower Extremity Weakness  i. CT head w/o contrast 3/14: normal  ii. Carotid artery U/S: pending  iii. Brain MRI w/o Contrast: pending  iv. Consult neurology  v. Continue aspirin & Plavix   b. Hx of Stroke  i. On aspirin & Plavix  ii. Baseline left side weakness  c. Hx of Migraines  i. Has not had issue with migraines since previous stroke  2. HEENT  a. No acute issues  3. Respiratory  a. No acute issues  4. Cardio  a. Chest pain  i. EKG 3/14: septal infarct of undetermined age  b. Hx of HLD  c. Hx of HTN  5. GI  a. No acute issues  6.   a.  Hx of Stress Urinary Incontinence, Possibly Mixed w/ Urge Incontinence  7. MSK  a. Myalgias/Arthralgias  i. Finished course of 10 mg prednisone 10 day course, stopped Crestor  ii.  Restarted Crestor      Diet: NPO  PT/OT evaluation: Ordered  DVT prophylaxis/ GI prophylaxis: Lovenox  Disposition: continue current care     Bunny Hodgkins, OMS-III  Attending physician: Dr. Thelma Lerma

## 2022-03-15 NOTE — PLAN OF CARE
Problem: Pain:  Goal: Pain level will decrease  Description: Pain level will decrease  3/15/2022 0137 by Vlad Fried RN  Outcome: Met This Shift  3/14/2022 2300 by Vlad Fried RN  Outcome: Met This Shift  Goal: Control of acute pain  Description: Control of acute pain  3/15/2022 0137 by Vlad Fried RN  Outcome: Met This Shift  3/14/2022 2300 by Vlad Fried RN  Outcome: Met This Shift  Goal: Control of chronic pain  Description: Control of chronic pain  3/15/2022 0137 by Vlad Fried RN  Outcome: Met This Shift  3/14/2022 2300 by Vlad Fried RN  Outcome: Met This Shift

## 2022-03-15 NOTE — PATIENT CARE CONFERENCE
[facility] Quality Flow/Interdisciplinary Rounds Progress Note        Quality Flow Rounds held on March 15, 2022    Disciplines Attending:  Bedside Nurse, ,  and Nursing Unit [address] Lashon Morataya was admitted on 3/14/2022  5:45 PM    Anticipated Discharge Date:  Expected Discharge Date: 03/18/22    Disposition:    Mani Score:  Mani Scale Score: 19    Readmission Risk              Risk of Unplanned Readmission:  0           Discussed patient goal for the day, patient clinical progression, and barriers to discharge.   The following Goal(s) of the Day/Commitment(s) have been identified:  Diagnostics - Report Results and Labs - Report Results      Mayte Gonzalez RN  March 15, 2022

## 2022-03-15 NOTE — PROGRESS NOTES
[facility]  Internal Medicine Residency Program  Progress Note - House Team     Patient:  Sanaz Garcia 48 y.o. female MRN: [id number]     Date of Service: 3/15/2022     CC: Lower Extremity weakness  Overnight events: None     Subjective     Patient was seen and examined this morning at bedside in no acute distress. Overnight no acute events noted. Patient is stable when seen this morning. She has normal strength in all extremities. Was complaining of headache today. Imaging obtained and did not any acute changes. Seen by neurology who feel that she is stable from their standpoint. No other acute issues at this time. Objective     Physical Exam:  · Vitals: /84   Pulse 72   Temp 97.5 °F (36.4 °C) (Temporal)   Resp 18   Ht 5' 4\" (1.626 m)   Wt 183 lb 12.8 oz (83.4 kg)   LMP 08/02/2012   SpO2 96%   BMI 31.55 kg/m²     · I & O - 24hr: No intake/output data recorded. Physical Exam  Constitutional:       Appearance: Normal appearance. Eyes:      Pupils: Pupils are equal, round, and reactive to light. Cardiovascular:      Rate and Rhythm: Normal rate and regular rhythm. Pulses: Normal pulses. Heart sounds: Normal heart sounds. No murmur heard. Pulmonary:      Effort: Pulmonary effort is normal.      Breath sounds: Normal breath sounds. No wheezing or rhonchi. Abdominal:      General: Abdomen is flat. There is no distension. Palpations: Abdomen is soft. Tenderness: There is no abdominal tenderness. Musculoskeletal:         General: Normal range of motion. Cervical back: Normal range of motion. Skin:     General: Skin is warm and dry. Capillary Refill: Capillary refill takes less than 2 seconds. Neurological:      General: No focal deficit present. Mental Status: She is alert and oriented to person, place, and time.    Psychiatric:         Mood and Affect: Mood normal.         Behavior: Behavior normal.       Subject  Pertinent Labs & Imaging Studies   lillian  CBC:   Lab Results   Component Value Date    WBC 4.9 03/14/2022    RBC 4.41 03/14/2022    HGB 13.4 03/14/2022    HCT 40.8 03/14/2022    MCV 92.5 03/14/2022    MCH 30.4 03/14/2022    MCHC 32.8 03/14/2022    RDW 13.4 03/14/2022     03/14/2022    MPV 10.7 03/14/2022     CBC with Differential:    Lab Results   Component Value Date    WBC 4.9 03/14/2022    RBC 4.41 03/14/2022    HGB 13.4 03/14/2022    HCT 40.8 03/14/2022     03/14/2022    MCV 92.5 03/14/2022    MCH 30.4 03/14/2022    MCHC 32.8 03/14/2022    RDW 13.4 03/14/2022    SEGSPCT 64 12/28/2013    LYMPHOPCT 34.8 03/14/2022    MONOPCT 9.9 03/14/2022    BASOPCT 0.4 03/14/2022    MONOSABS 0.48 03/14/2022    LYMPHSABS 1.69 03/14/2022    EOSABS 0.10 03/14/2022    BASOSABS 0.02 03/14/2022     Platelets:    Lab Results   Component Value Date     03/14/2022     Hemoglobin/Hematocrit:    Lab Results   Component Value Date    HGB 13.4 03/14/2022    HCT 40.8 03/14/2022     CMP:    Lab Results   Component Value Date     03/15/2022    K 3.6 03/15/2022    K 4.1 03/14/2022     03/15/2022    CO2 26 03/15/2022    BUN 11 03/15/2022    CREATININE 0.8 03/15/2022    GFRAA >60 03/15/2022    LABGLOM >60 03/15/2022    GLUCOSE 99 03/15/2022    PROT 7.2 12/28/2013    LABALBU 3.9 12/28/2013    CALCIUM 8.9 03/15/2022    BILITOT 0.3 12/28/2013    ALKPHOS 64 12/28/2013    AST 15 12/28/2013    ALT 16 12/28/2013     BMP:    Lab Results   Component Value Date     03/15/2022    K 3.6 03/15/2022    K 4.1 03/14/2022     03/15/2022    CO2 26 03/15/2022    BUN 11 03/15/2022    LABALBU 3.9 12/28/2013    CREATININE 0.8 03/15/2022    CALCIUM 8.9 03/15/2022    GFRAA >60 03/15/2022    LABGLOM >60 03/15/2022    GLUCOSE 99 03/15/2022     Sodium:    Lab Results   Component Value Date     03/15/2022     Potassium:    Lab Results   Component Value Date    K 3.6 03/15/2022    K 4.1 03/14/2022     BUN/Creatinine:    Lab Results   Component Value Date    BUN 11 03/15/2022    CREATININE 0.8 03/15/2022       CT Head WO Contrast   Final Result   No acute intracranial abnormality. XR CHEST (2 VW)   Final Result   No acute process. US CAROTID ARTERY BILATERAL    (Results Pending)   MRI BRAIN WO CONTRAST    (Results Pending)        Resident's Assessment and Plan     Assessment and Plan:    1. Left lower extremity weakness 2/2 migraine?  -Has been seen by neurology  -NIHSS is 0  -Imaging negative for new acute changes, shows old infarct in right thalamus  -Has been seen by neurology who do not feel that this is due to TIA or MS, thought to be potentially due to her mirgraine  -At this time continue home Aspirin and Plavix  -Resume Statin and Ezetimibe  -Toradol for headache pain  -CRP and ESR ordered   -Follow B12 and thiamine     2. Hx Migraine headaches  -Hx of migraine however she has not had one in several years according to her account  -Will use Toradol for pain at this time and continue to monitor symptoms  -Conintue to follow, may need long term therapy    3. Hx Stroke in 2013  -Continue home aspirin and plavix    4. Hx HTN  -Continue home losartan and Metoprolol    5.  Hx HLD  -Continue Ezetimibe and Statin    PT/OT evaluation: Seeing  DVT prophylaxis/ GI prophylaxis: Lovenox/Protonix  Disposition: continue current care     Lizette Patiño MD, PGY-1  Attending physician: Dr. Mallorie Burch

## 2022-03-15 NOTE — CONSULTS
[facility]  Neurology Consult    Date:  3/15/2022  Patient Name:  Ricardo Julio  YOB: 1975  MRN: [id number]     PCP:  Santi Barnett MD   Referring:  No ref. provider found      Chief Complaint:   Chief Complaint   Patient presents with    Chest Pain     for a few days    Extremity Weakness     b/l legs weakness, \"just not right\"  since friday  went to see Dr Barbie Reddy, who sent her here today, has been falling, hit head yesterday        History obtained from: EMR and pt     Assessment  Jennifer Gomez is a 48 y.o. female with LLE weakness, likely 2/2 stroke recrudescence. Plan  · Imaging reviewed with house medicine team.  No acute abnormality. Chronic infarctions of the right basal ganglia and radiata in the posterior limb of the right internal capsule are noted. · Patient symptoms could be in the setting of her migraine. Recommend treatment of migraine. · Follow CRP and ESR. Follow B12 and thiamine. · Continue Zetia and statin for hyperlipidemia treatment. · Continue blood pressure control with goal less than 140/90. · Continue aspirin and Plavix. History of Present Illness:  Zoie Culver is a 48 y.o. right handed female presenting for evaluation of left lower extremity weakness. Patient reports she was at the grocery store yesterday and her \"left leg gave out. \"  She reports this is never happened before. Patient ambulates with the help of a walker. She reports she was \"dragging her left leg\" and then decided to come to the ED for further evaluation. She is concerned she is having another stroke. Patient had a stroke back in 2013 where she experienced left upper extremity and lower extremity weakness. She is compliant with aspirin and Plavix since that time. Patient seen and examined this AM at bedside. Patient strength does improve with encouragement.   She does complain of a headache this afternoon on reevaluation and weakness of the right upper extremity. She does have a history of chronic migraines. Reports migraine is not associated with photo phonophobia. Denies any nausea or vomiting at this time. Denies any sick contacts. Denies any fevers or chills. Denies any constipation or diarrhea. Denies any paresthesias of the upper or lower extremities. Review of Systems:  Constitutional  · Weight loss: No  · Fever: No    Eyes  · Double Vision: No  · Visions loss: No    Ears, Nose, Mouth, and Throat  · Difficulty swallowing: No    Cardiovascular  · Chest Pain: No    Respiratory  · Shortness of Breath: No    Gastrointestinal  · Abdominal Pain: No    Genitourinary  · Difficulty with Urination: No    Integumentary  Rash: No    Musculoskeletal  · Back Pain: No    Neurological  · Headaches: No  · Weakness:  Yes  · Numbness: No  · Seizures: No  · Difficulty with Memory: No  · Further symptoms noted in HPI    Psychiatric  · Anxiety: No  · Depression: No    Complete 10-point review of systems is negative except as noted above in my HPI    Medical History:   Past Medical History:   Diagnosis Date    Chest pain 7-    exercise nuclear stress test    CVA (cerebral infarction)     Hypercholesterolemia 2/27/2014    Hypertension     Migraine         Surgical History:   Past Surgical History:   Procedure Laterality Date    ECHO COMPLETE  12/26/2013         ECHOCARDIOGRAM TRANSESOPHAGEAL  12/30/2013         FOOT SURGERY Right     HYSTERECTOMY      TRANSESOPHAGEAL ECHOCARDIOGRAM  12/30/2013    Dr Wong Kline          Family History:   Family History   Problem Relation Age of Onset    Stroke Mother     Heart Disease Mother        Social History:  Social History     Tobacco Use    Smoking status: Never Smoker    Smokeless tobacco: Never Used   Substance Use Topics    Alcohol use: No    Drug use: No        Current Medications:      Current Facility-Administered Medications   Medication Dose Route Frequency Provider Last Rate Last Admin  traZODone (DESYREL) tablet 100 mg  100 mg Oral Nightly Matt Damon MD   100 mg at 03/15/22 0034    vitamin D (ERGOCALCIFEROL) capsule 50,000 Units  50,000 Units Oral Weekly Yovanny Basurto MD   50,000 Units at 03/15/22 1005    gabapentin (NEURONTIN) capsule 300 mg  300 mg Oral TID Yovanny Basurto MD   300 mg at 03/15/22 1441    metoprolol tartrate (LOPRESSOR) tablet 50 mg  50 mg Oral BID Rama Briscoe MD   50 mg at 03/15/22 1005    ezetimibe (ZETIA) tablet 10 mg  10 mg Oral Nightly Rama Briscoe MD        aspirin EC tablet 81 mg  81 mg Oral Daily Matt Damon MD   81 mg at 03/15/22 0956    clopidogrel (PLAVIX) tablet 75 mg  75 mg Oral Daily Matt Damon MD   75 mg at 03/15/22 0956    losartan (COZAAR) tablet 100 mg  100 mg Oral Daily Matt Damon MD   100 mg at 03/15/22 0956    rosuvastatin (CRESTOR) tablet 40 mg  40 mg Oral Daily Matt Damon MD   40 mg at 03/15/22 0956    sodium chloride flush 0.9 % injection 5-40 mL  5-40 mL IntraVENous 2 times per day Matt Damon MD   10 mL at 03/15/22 0957    sodium chloride flush 0.9 % injection 5-40 mL  5-40 mL IntraVENous PRN Matt Damon MD        0.9 % sodium chloride infusion  25 mL IntraVENous PRN Matt Damon MD        enoxaparin (LOVENOX) injection 40 mg  40 mg SubCUTAneous Daily Matt Damon MD   40 mg at 03/15/22 0956    ondansetron (ZOFRAN-ODT) disintegrating tablet 4 mg  4 mg Oral Q8H PRN Matt Damon MD        Or    ondansetron (ZOFRAN) injection 4 mg  4 mg IntraVENous Q6H PRN Matt Damon MD        polyethylene glycol (GLYCOLAX) packet 17 g  17 g Oral Daily PRN Matt Damon MD        acetaminophen (TYLENOL) tablet 650 mg  650 mg Oral Q6H PRN Matt Damon MD   650 mg at 03/15/22 2429    Or    acetaminophen (TYLENOL) suppository 650 mg  650 mg Rectal Q6H PRN Matt Damon MD        perflutren lipid microspheres (DEFINITY) injection 1.65 mg  1.5 mL IntraVENous ONCE PRN Matt Damon MD            Allergies:      No Known Allergies     Physical Examination  Vitals   Vitals:    03/15/22 0421 03/15/22 0812 03/15/22 1209 03/15/22 1459   BP: (!) 118/59 135/84 (!) 152/93 130/86   Pulse: 66 72 59 76   Resp: 18 18 20 15   Temp: 97.8 °F (36.6 °C) 97.5 °F (36.4 °C) 96.9 °F (36.1 °C) 97.5 °F (36.4 °C)   TempSrc: Temporal Temporal Core    SpO2: 98% 96%  99%   Weight:       Height:            General: Patient appears in no acute distress. Awake and oriented x 4. HEENT: Normocephalic, atraumatic  Chest: Clear to auscultation bilaterally  Heart: No murmurs appreciated  Extremities/Peripheral vascular: No edema/swelling noted. No cold limbs noted. Neurologic Examination    Mental Status  Alert, and oriented to person, place and time. Speech is fluent with intact comprehension. No evidence of memory impairment. Attention and concentration appeared normal.     Cranial Nerves  II. Visual fields full to confrontation bilaterally. III, IV, VI: Pupils equally round and reactive to light, 3 to 2 mm bilaterally. EOMs: full, no nystagmus. V. Facial sensation intact to light touch bilaterally  VII: Facial movements symmetric and strong  VIII: Hearing intact to voice  IX,X: Palate elevates symmetrically. No dysarthria  XI: Sternocleidomastoid and trapezius 5/5 bilaterally   XII: Tongue is midline    Motor Strength improves with encouragement.       Right Left   Right Left   Deltoid 5 5  Hip Flexion 5 5   Biceps      5  5  Knee Extension 5 5   Triceps 5 5  Knee Flexion 5 5   Handgrip 5 5  Ankle Dorsiflexion 5 5       Ankle Plantarflexion 5 5     Tone: Normal in all four limbs    Bulk: Normal in all four limbs with no evidence of atrophy    Pronator drift: absent bilaterally    Sensation  · Light Touch: Intact distally in all four limbs  · Pinprick: Intact distally in all four limbs  · Vibration: Intact distally in all four limbs  · Proprioception: Intact distally in all four limbs    Reflexes     Right Left   Biceps 2 2   Brachioradialis 2 2   Triceps 2 2   Patellar 2 2 Achilles 2 2   ankle clonus none none     Toes down going bilaterally. Coordination  Rapid alternating movements normal in bilateral upper extremities  Finger to nose testing normal bilaterally  Heel to shin testing normal bilaterally    Gait    Deferred for safety/fall consideration       Labs  Recent Labs     03/14/22  1303 03/14/22  1303 03/15/22  0616      < > 141   K 4.1  --  3.6      < > 102   CO2 27   < > 26   BUN 10   < > 11   CREATININE 0.9   < > 0.8   GLUCOSE 93   < > 99   CALCIUM 9.1   < > 8.9   WBC 4.9  --   --    RBC 4.41  --   --    HGB 13.4  --   --    HCT 40.8  --   --    MCV 92.5  --   --    MCH 30.4  --   --    MCHC 32.8  --   --    RDW 13.4  --   --      --   --    MPV 10.7  --   --    LABA1C  --   --  5.9*    < > = values in this interval not displayed. Imaging  MRI BRAIN WO CONTRAST   Final Result   1. No acute intracranial abnormality. 2. Stable small chronic infarction in the right basal ganglia and radiata,   possibly involving the posterior limb of the right internal capsule. 3. No characteristic lesion of multiple sclerosis identified. RECOMMENDATIONS:   Unavailable         CT Head WO Contrast   Final Result   No acute intracranial abnormality. XR CHEST (2 VW)   Final Result   No acute process. US CAROTID ARTERY BILATERAL    (Results Pending)         I have personally reviewed the following images: CT head      Other Testing Results  MRI -   Impression:     1.  No acute intracranial abnormality. 2. Stable small chronic infarction in the right basal ganglia and radiata,   possibly involving the posterior limb of the right internal capsule. 3. No characteristic lesion of multiple sclerosis identified.       Electronically signed by Clarita Ortega DO on 3/15/2022 at 3:43 PM

## 2022-03-16 VITALS
HEART RATE: 65 BPM | BODY MASS INDEX: 31.69 KG/M2 | SYSTOLIC BLOOD PRESSURE: 104 MMHG | WEIGHT: 185.6 LBS | DIASTOLIC BLOOD PRESSURE: 57 MMHG | OXYGEN SATURATION: 97 % | RESPIRATION RATE: 16 BRPM | HEIGHT: 64 IN | TEMPERATURE: 97.2 F

## 2022-03-16 LAB
LV EF: 73 %
LVEF MODALITY: NORMAL

## 2022-03-16 PROCEDURE — G0378 HOSPITAL OBSERVATION PER HR: HCPCS

## 2022-03-16 PROCEDURE — 99217 PR OBSERVATION CARE DISCHARGE MANAGEMENT: CPT | Performed by: INTERNAL MEDICINE

## 2022-03-16 PROCEDURE — 2580000003 HC RX 258: Performed by: INTERNAL MEDICINE

## 2022-03-16 PROCEDURE — 6370000000 HC RX 637 (ALT 250 FOR IP): Performed by: INTERNAL MEDICINE

## 2022-03-16 PROCEDURE — 6360000002 HC RX W HCPCS: Performed by: INTERNAL MEDICINE

## 2022-03-16 PROCEDURE — 96372 THER/PROPH/DIAG INJ SC/IM: CPT

## 2022-03-16 PROCEDURE — 93306 TTE W/DOPPLER COMPLETE: CPT

## 2022-03-16 PROCEDURE — 6370000000 HC RX 637 (ALT 250 FOR IP)

## 2022-03-16 RX ORDER — ERGOCALCIFEROL 1.25 MG/1
50000 CAPSULE ORAL WEEKLY
Qty: 11 CAPSULE | Refills: 0 | Status: SHIPPED | OUTPATIENT
Start: 2022-03-22 | End: 2022-06-01

## 2022-03-16 RX ADMIN — CLOPIDOGREL BISULFATE 75 MG: 75 TABLET ORAL at 09:14

## 2022-03-16 RX ADMIN — ROSUVASTATIN 40 MG: 20 TABLET, FILM COATED ORAL at 09:14

## 2022-03-16 RX ADMIN — ENOXAPARIN SODIUM 40 MG: 100 INJECTION SUBCUTANEOUS at 09:14

## 2022-03-16 RX ADMIN — ONDANSETRON 4 MG: 4 TABLET, ORALLY DISINTEGRATING ORAL at 03:45

## 2022-03-16 RX ADMIN — Medication 10 ML: at 09:19

## 2022-03-16 RX ADMIN — ASPIRIN 81 MG: 81 TABLET, COATED ORAL at 09:14

## 2022-03-16 RX ADMIN — ACETAMINOPHEN 650 MG: 325 TABLET ORAL at 11:11

## 2022-03-16 RX ADMIN — ACETAMINOPHEN 650 MG: 325 TABLET ORAL at 03:40

## 2022-03-16 RX ADMIN — LOSARTAN POTASSIUM 100 MG: 50 TABLET, FILM COATED ORAL at 09:14

## 2022-03-16 RX ADMIN — GABAPENTIN 300 MG: 300 CAPSULE ORAL at 09:14

## 2022-03-16 RX ADMIN — GABAPENTIN 300 MG: 300 CAPSULE ORAL at 13:32

## 2022-03-16 RX ADMIN — METOPROLOL TARTRATE 50 MG: 50 TABLET, FILM COATED ORAL at 09:14

## 2022-03-16 ASSESSMENT — PAIN DESCRIPTION - ONSET: ONSET: ON-GOING

## 2022-03-16 ASSESSMENT — PAIN DESCRIPTION - LOCATION: LOCATION: ARM;HEAD;LEG

## 2022-03-16 ASSESSMENT — PAIN DESCRIPTION - PROGRESSION: CLINICAL_PROGRESSION: NOT CHANGED

## 2022-03-16 ASSESSMENT — PAIN SCALES - GENERAL
PAINLEVEL_OUTOF10: 4
PAINLEVEL_OUTOF10: 4
PAINLEVEL_OUTOF10: 6

## 2022-03-16 ASSESSMENT — PAIN DESCRIPTION - DESCRIPTORS: DESCRIPTORS: CONSTANT;ACHING;DULL

## 2022-03-16 ASSESSMENT — PAIN DESCRIPTION - PAIN TYPE: TYPE: ACUTE PAIN

## 2022-03-16 ASSESSMENT — PAIN DESCRIPTION - FREQUENCY: FREQUENCY: CONTINUOUS

## 2022-03-16 ASSESSMENT — PAIN - FUNCTIONAL ASSESSMENT: PAIN_FUNCTIONAL_ASSESSMENT: PREVENTS OR INTERFERES SOME ACTIVE ACTIVITIES AND ADLS

## 2022-03-16 NOTE — PROGRESS NOTES
CLINICAL PHARMACY NOTE: MEDS TO BEDS    Total # of Prescriptions Filled: 1   The following medications were delivered to the patient:  · Vitamin D 1.25 mg  · Delivered to pt @ 12:40P    Additional Documentation:

## 2022-03-16 NOTE — DISCHARGE SUMMARY
[address]  Discharge Summary    PCP: Percy Gardiner MD    Admit Date:3/14/2022  Discharge Date: 3/16/2022    Admission Diagnosis:   1. Weakness and blurring vision 2/2 MS vs TIA  2. Hx HTN  3. Hx HLD  4. Hx Migraine    Discharge Diagnosis:  1. Left Lower Extremity weakness 2/2 Migraine  2. Hx Migraine Headaches   3. Hx Stroke 2013  4. Hx HTN  5. Hx HLD    Hospital Course: The patient is a 48 y.o. female with a past medical history of stroke, hypertension, hyperlipidemia presented to the office because of bilateral lower extremity weakness. Patient mentions that today he was in the grocery store that she felt her lower extremities were so heavy and weak. She went and saw her neurologist, Dr. Trevor Manuel and he recommended her to come to the hospital.   Patient has a history of a stroke back in 2013 and she has been taking aspirin and Plavix for it. In the ED patient was hemodynamically stable, and afebrile. She was alert and oriented x3. Head CT did not show any acute abnormalities. All the lab studies were within normal limits. She was admitted to the floor for evaluation of possible TIA. Patient had MRI and Carotid ultrasound of the neck, neither imaging showed acute findings that would explain patients symptoms. She was seen by neurology who feel that her symptoms are mainly related to her history of migraine headaches. Patient states that currently her headaches are under control using only tylenol. Recommended to follow up with PCP for possible long term migraine management. Vit D was low and she was started on Vit D 64717 Units weekly for 11 weeks. At this time no other issues present on discharge. Significant findings (history and exam, laboratory, radiological, pathology, other tests):   Physical Exam  Constitutional:       Appearance: Normal appearance. Eyes:      Pupils: Pupils are equal, round, and reactive to light.    Cardiovascular:      Rate and Rhythm: Normal rate and regular rhythm. Pulses: Normal pulses. Heart sounds: Normal heart sounds. No murmur heard. Pulmonary:      Effort: Pulmonary effort is normal.      Breath sounds: Normal breath sounds. No wheezing or rhonchi. Abdominal:      General: Abdomen is flat. There is no distension. Palpations: Abdomen is soft. Tenderness: There is no abdominal tenderness. Musculoskeletal:         General: Normal range of motion. Cervical back: Normal range of motion. Skin:     General: Skin is warm and dry. Capillary Refill: Capillary refill takes less than 2 seconds. Neurological:      General: No focal deficit present. Mental Status: She is alert and oriented to person, place, and time. Psychiatric:         Mood and Affect: Mood normal.         Behavior: Behavior normal.           Pending test results:   1. None    Consults:  1. Neurology    Procedures:  1. None    Condition at discharge: Stable    Disposition: home    Discharge Medications:     Medication List      START taking these medications    vitamin D 1.25 MG (99931 UT) Caps capsule  Commonly known as: ERGOCALCIFEROL  Take 1 capsule by mouth once a week for 11 doses  Start taking on: March 22, 2022        Victoriano Raymond taking these medications    aspirin 81 MG EC tablet     clopidogrel 75 MG tablet  Commonly known as: PLAVIX  Take 1 tablet by mouth daily. ezetimibe 10 MG tablet  Commonly known as: Zetia  Take 1 tablet by mouth daily. gabapentin 300 MG capsule  Commonly known as: NEURONTIN     losartan 100 MG tablet  Commonly known as: COZAAR     metoprolol tartrate 50 MG tablet  Commonly known as: LOPRESSOR  Take 1 tablet by mouth 2 times daily. potassium chloride 10 MEQ extended release tablet  Commonly known as: KLOR-CON M  Take 1 tablet by mouth 2 times daily (with meals).      rosuvastatin 40 MG tablet  Commonly known as: CRESTOR     traZODone 100 MG tablet  Commonly known as: Constantino Clay Where to Get Your Medications      These medications were sent to [facility], [address] [address]    Phone: [phone]   · vitamin D 1.25 MG (69112 UT) Caps capsule          Internal medicine    Follow ups   Please follow up with your primary care physician ( Mayank Berg MD) within 10 days of discharge from hospital. Please call as soon as possible to make an appointment. Please contact the internal medicine clinic for an appointment if you are unable to get an appointment with your PCP. Changes in healthcare    Please take all medications as indicated above   Diet: regular diet    Activity: activity as tolerated   Additional labs, testing or imaging needed after discharge   o BMP in 1 week  o Vit D in 11 weeks   New medication prescribed after this hospitalization are Vit D for 11 weeks, then get repeat Vit D, Please refer to your Med list for details    Please contact us if you have any concerns, wish to change or make an appointment:  [facility] Internal medicine clinic    Phone: [phone]   Fax: [phone] [address]   Or please call the nurse line at [phone].  Should you have further questions in regards to this visit, you can review your clinical note and after visit summary document on your [employer] account. Other questions can be directed to our nurse line at [phone].  Other than any new prescriptions given to you today, the list of home medications on this After Visit Summary are based on information provided to us from you and your healthcare providers. This information, including the list, dose, and frequency of medications is only as accurate as the information you provided. If you have any questions or concerns about your home medications, please contact your Primary Care Physician for further clarification.     Brett Armando MD PGY-1   2:10 PM 3/16/2022

## 2022-03-16 NOTE — PROGRESS NOTES
Occupational Therapy  Date:3/16/2022  Patient Name: Brayan Siddiqui  MRN: [id number]  : 1975  Room: [address] [place] [address]     OT order received and appreciated. Chart reviewed. OT screen completed this session; pt reports independence with ADLs and ambulation .   Skilled OT evaluation not indicated    Bentley Perry OTR/L #[id number]

## 2022-03-16 NOTE — PATIENT CARE CONFERENCE
P Quality Flow/Interdisciplinary Rounds Progress Note        Quality Flow Rounds held on March 16, 2022    Disciplines Attending:  Bedside Nurse, ,  and Nursing Unit [address] Lashon Morataya was admitted on 3/14/2022  5:45 PM    Anticipated Discharge Date:  Expected Discharge Date: 03/18/22    Disposition:    Mani Score:  Mani Scale Score: 19    Readmission Risk              Risk of Unplanned Readmission:  0           Discussed patient goal for the day, patient clinical progression, and barriers to discharge.   The following Goal(s) of the Day/Commitment(s) have been identified:  Diagnostics - Report Results      Carter Scott RN  March 16, 2022

## 2022-03-16 NOTE — PROGRESS NOTES
[facility]  Internal Medicine Residency Program  Progress Note - House Team     Patient:  Jaquelin Butcher 48 y.o. female MRN: [id number]     Date of Service: 3/16/2022     CC: Bilateral weakness & chest pain    Subjective   Brief Summary:  Jennifer's symptoms began about 1 month ago with right arm weakness that has since progressed to her legs. For the past 2 weeks it has worsened. She states that she is \"just not feeling right. \" Her weakness is worse in her left leg. She has history of stroke (on aspirin & Plavix) with left side deficits but reports that the left sided weakness she is experiencing now is worse and different from her baseline. She reported difficulty with her balance over the past 2 weeks and sustained a fall during which she hit her head. At the time of presentation she reported substernal 4/10 chest pain. She also reported that she finished a 10 day course of steroids on Friday 3/11. Her PCP stopped her Crestor and started the steroid 2/2 weakness and myalgias. She has since restarted the Crestor. She had an EKG in the ED that showed evidence of septal infarct of undetermined age. She also had a noncontrast head CT and CXR which were both normal.    Hospital Day: 3    Overnight Events:   No acute events overnight   Passed bedside swallow yesterday   Per neurology -- recrudescence of prior stroke symptoms    This AM   Patient was seen and examined this morning at bedside   Reports feeling better than yesterday   Weakness improved slightly since yesterday   Uses cane at home for ambulation PRN    Objective     Physical Exam:  · Vitals: /79   Pulse 61   Temp 96.8 °F (36 °C) (Temporal)   Resp 16   Ht 5' 4\" (1.626 m)   Wt 185 lb 9.6 oz (84.2 kg)   LMP 08/02/2012   SpO2 97%   BMI 31.86 kg/m²     · I & O - 24hr: No intake/output data recorded. · General Appearance: alert, appears stated age and cooperative  · HEENT:  Head: Normal, normocephalic, atraumatic.   · Neck / Thyroid: Supple, no masses, nodes, nodules or enlargement. · Neck: no adenopathy and thyroid not enlarged, symmetric, no tenderness/mass/nodules  · Lung: clear to auscultation bilaterally  · Heart: regular rate and rhythm and S1, S2 normal  · Abdomen: soft, non-tender; bowel sounds normal; no masses,  no organomegaly  · Extremities:  no edema, redness or tenderness in the calves or thighs  · Musculokeletal: No joint swelling, no muscle tenderness. ROM normal in all joints of extremities.    · Neurologic: Mental status: Alert, oriented, thought content appropriate, 5/5 strength, neg Babinski sign, sensation intact  Subject  Pertinent Labs & Imaging Studies   Josie  CBC:   Lab Results   Component Value Date    WBC 4.9 03/14/2022    RBC 4.41 03/14/2022    HGB 13.4 03/14/2022    HCT 40.8 03/14/2022    MCV 92.5 03/14/2022    MCH 30.4 03/14/2022    MCHC 32.8 03/14/2022    RDW 13.4 03/14/2022     03/14/2022    MPV 10.7 03/14/2022     CBC with Differential:    Lab Results   Component Value Date    WBC 4.9 03/14/2022    RBC 4.41 03/14/2022    HGB 13.4 03/14/2022    HCT 40.8 03/14/2022     03/14/2022    MCV 92.5 03/14/2022    MCH 30.4 03/14/2022    MCHC 32.8 03/14/2022    RDW 13.4 03/14/2022    SEGSPCT 64 12/28/2013    LYMPHOPCT 34.8 03/14/2022    MONOPCT 9.9 03/14/2022    BASOPCT 0.4 03/14/2022    MONOSABS 0.48 03/14/2022    LYMPHSABS 1.69 03/14/2022    EOSABS 0.10 03/14/2022    BASOSABS 0.02 03/14/2022     WBC:    Lab Results   Component Value Date    WBC 4.9 03/14/2022     Platelets:    Lab Results   Component Value Date     03/14/2022     Hemoglobin/Hematocrit:    Lab Results   Component Value Date    HGB 13.4 03/14/2022    HCT 40.8 03/14/2022     CMP:    Lab Results   Component Value Date     03/15/2022    K 3.6 03/15/2022    K 4.1 03/14/2022     03/15/2022    CO2 26 03/15/2022    BUN 11 03/15/2022    CREATININE 0.8 03/15/2022    GFRAA >60 03/15/2022    LABGLOM >60 03/15/2022    GLUCOSE 99 03/15/2022    PROT 7.2 12/28/2013    LABALBU 3.9 12/28/2013    CALCIUM 8.9 03/15/2022    BILITOT 0.3 12/28/2013    ALKPHOS 64 12/28/2013    AST 15 12/28/2013    ALT 16 12/28/2013     BMP:    Lab Results   Component Value Date     03/15/2022    K 3.6 03/15/2022    K 4.1 03/14/2022     03/15/2022    CO2 26 03/15/2022    BUN 11 03/15/2022    LABALBU 3.9 12/28/2013    CREATININE 0.8 03/15/2022    CALCIUM 8.9 03/15/2022    GFRAA >60 03/15/2022    LABGLOM >60 03/15/2022    GLUCOSE 99 03/15/2022     Sodium:    Lab Results   Component Value Date     03/15/2022     Potassium:    Lab Results   Component Value Date    K 3.6 03/15/2022    K 4.1 03/14/2022     BUN/Creatinine:    Lab Results   Component Value Date    BUN 11 03/15/2022    CREATININE 0.8 03/15/2022     U/A:    Lab Results   Component Value Date    COLORU Yellow 03/15/2022    PROTEINU Negative 03/15/2022    PHUR 7.5 03/15/2022    WBCUA 0-1 03/15/2022    RBCUA 0-1 03/15/2022    BACTERIA RARE 03/15/2022    CLARITYU Clear 03/15/2022    SPECGRAV 1.015 03/15/2022    LEUKOCYTESUR Negative 03/15/2022    UROBILINOGEN 0.2 03/15/2022    BILIRUBINUR Negative 03/15/2022    BLOODU TRACE-INTACT 03/15/2022    GLUCOSEU Negative 03/15/2022     FLP:    Lab Results   Component Value Date    TRIG 222 03/15/2022    HDL 36 03/15/2022    LDLCALC 131 03/15/2022    LABVLDL 44 03/15/2022     TSH:    Lab Results   Component Value Date    TSH 0.907 03/15/2022     FOLATE:    Lab Results   Component Value Date    FOLATE 15.4 03/15/2022     CRP: 0.3    MRI BRAIN WO CONTRAST   Final Result   1. No acute intracranial abnormality. 2. Stable small chronic infarction in the right basal ganglia and radiata,   possibly involving the posterior limb of the right internal capsule. 3. No characteristic lesion of multiple sclerosis identified. RECOMMENDATIONS:   Unavailable         US CAROTID ARTERY BILATERAL   Final Result   Atherosclerotic disease.  No hemodynamically significant stenosis is   identified   Estimated stenosis by NASCET criteria in the proximal right carotid   artery is between 0% and 49%. Estimated stenosis by NASCET criteria in the proximal left carotid   artery is between 0% and 49%. CT Head WO Contrast   Final Result   No acute intracranial abnormality. XR CHEST (2 VW)   Final Result   No acute process. Resident's Assessment and Plan     Assessment and Plan:    1. Neuro  a. Bilateral Upper & Lower Extremity Weakness  i. CT head w/o contrast 3/14: normal  ii. Carotid artery U/S 3/15: atherosclerotic disease, insignificant stenosis < 50% bilaterally  iii. Brain MRI w/o Contrast 3/15: nothing acute, no signs MS, old stroke in right basal ganglia & radiata involving posterior limb of right internal capsule  iv. Consult neurology -- recrudescence of prior stroke symptoms  v. Continue aspirin & Plavix   b. Hx of Stroke  i. On aspirin & Plavix  ii. Baseline left side weakness  c. Hx of Migraines  i. Has not had issue with migraines since previous stroke  ii. HA 3/15 with changes in vision -- given Toradol 15 mg IV per neuro recs  2. HEENT  a. No acute issues  3. Respiratory  a. No acute issues  4. Cardio  a. Chest pain  i. EKG 3/14: septal infarct of undetermined age  b. Hx of HLD  1. Continue Crestor 40 mg, Zetia 10 mg  c. Hx of HTN  1. Continue amlodipine 10 mg, Lipitor 40 mg, losartan 100 mg, metoprolol tartrate 50 mg BID  5. GI  a. No acute issues  6.   a. Hx of Stress Urinary Incontinence, Possibly Mixed w/ Urge Incontinence  7. MSK  a. Myalgias/Arthralgias  i. Finished course of 10 mg prednisone 10 day course, stopped Crestor  ii.  Restarted Crestor      Diet: Regular adult  PT/OT evaluation: --  DVT prophylaxis/ GI prophylaxis: Lovenox 40 mg  Disposition: Discharge to home    ZEKE Hyatt-III  Attending physician: Dr. Polly Cameron

## 2022-03-16 NOTE — CARE COORDINATION
Spoke with 2d echo dept regarding getting echo done today. Pt is observation level of care. Met with pt in room. Plan remains to home. Denies any dme needs at home. Not interested in hhc. I talked to her about outpatient therapy and she said she will consider it. Sw/cm will follow.

## 2022-03-16 NOTE — PROGRESS NOTES
[address] [facility]  Internal Medicine Residency / [address]    Attending Physician Statement  I have discussed the case, including pertinent history and exam findings with the resident and the team.  I have seen and examined the patient and the key elements of the encounter have been performed by me. I agree with the assessment, plan and orders as documented by the resident. A&O  Work up reviewed  Migraine resolved   May have symptomatic fibromyalgia  On Trazodone   HTN controlled  Has old Thalamic infarct  Plan;Review meds and discharge   Remainder of medical problems as per resident note.       Jose A Campos MD [facility]  Internal Medicine Residency Faculty

## 2025-03-04 ENCOUNTER — TRANSCRIBE ORDERS (OUTPATIENT)
Dept: ADMINISTRATIVE | Age: 50
End: 2025-03-04

## 2025-03-04 DIAGNOSIS — Z12.31 ENCOUNTER FOR SCREENING MAMMOGRAM FOR MALIGNANT NEOPLASM OF BREAST: Primary | ICD-10-CM

## 2025-03-18 ENCOUNTER — HOSPITAL ENCOUNTER (OUTPATIENT)
Dept: MAMMOGRAPHY | Age: 50
Discharge: HOME | End: 2025-03-20
Attending: SPECIALIST
Payer: COMMERCIAL

## 2025-03-18 VITALS — HEIGHT: 64 IN | WEIGHT: 180 LBS | BODY MASS INDEX: 30.73 KG/M2

## 2025-03-18 DIAGNOSIS — Z12.31 ENCOUNTER FOR SCREENING MAMMOGRAM FOR MALIGNANT NEOPLASM OF BREAST: ICD-10-CM

## 2025-03-18 PROCEDURE — 77067 SCR MAMMO BI INCL CAD: CPT

## 2025-03-25 ENCOUNTER — HOSPITAL ENCOUNTER (EMERGENCY)
Age: 50
Discharge: HOME | End: 2025-03-25
Attending: FAMILY MEDICINE
Payer: COMMERCIAL

## 2025-03-25 VITALS
TEMPERATURE: 97.6 F | HEART RATE: 67 BPM | OXYGEN SATURATION: 100 % | RESPIRATION RATE: 18 BRPM | SYSTOLIC BLOOD PRESSURE: 139 MMHG | DIASTOLIC BLOOD PRESSURE: 90 MMHG

## 2025-03-25 DIAGNOSIS — M79.642 PAIN IN BOTH HANDS: Primary | ICD-10-CM

## 2025-03-25 DIAGNOSIS — M79.641 PAIN IN BOTH HANDS: Primary | ICD-10-CM

## 2025-03-25 PROCEDURE — 96372 THER/PROPH/DIAG INJ SC/IM: CPT

## 2025-03-25 PROCEDURE — 99284 EMERGENCY DEPT VISIT MOD MDM: CPT

## 2025-03-25 PROCEDURE — 6360000002 HC RX W HCPCS: Performed by: FAMILY MEDICINE

## 2025-03-25 RX ORDER — PREDNISONE 20 MG/1
40 TABLET ORAL DAILY
Qty: 10 TABLET | Refills: 0 | Status: SHIPPED | OUTPATIENT
Start: 2025-03-25 | End: 2025-03-30

## 2025-03-25 RX ORDER — KETOROLAC TROMETHAMINE 30 MG/ML
30 INJECTION, SOLUTION INTRAMUSCULAR; INTRAVENOUS ONCE
Status: COMPLETED | OUTPATIENT
Start: 2025-03-25 | End: 2025-03-25

## 2025-03-25 RX ADMIN — KETOROLAC TROMETHAMINE 30 MG: 30 INJECTION, SOLUTION INTRAMUSCULAR; INTRAVENOUS at 16:17

## 2025-03-25 ASSESSMENT — PAIN DESCRIPTION - PAIN TYPE: TYPE: ACUTE PAIN

## 2025-03-25 ASSESSMENT — PAIN SCALES - GENERAL: PAINLEVEL_OUTOF10: 9

## 2025-03-25 ASSESSMENT — PAIN - FUNCTIONAL ASSESSMENT
PAIN_FUNCTIONAL_ASSESSMENT: 0-10
PAIN_FUNCTIONAL_ASSESSMENT: PREVENTS OR INTERFERES SOME ACTIVE ACTIVITIES AND ADLS

## 2025-03-25 ASSESSMENT — PAIN DESCRIPTION - FREQUENCY: FREQUENCY: CONTINUOUS

## 2025-03-25 ASSESSMENT — PAIN DESCRIPTION - LOCATION: LOCATION: HAND

## 2025-03-25 ASSESSMENT — PAIN DESCRIPTION - ORIENTATION: ORIENTATION: RIGHT;LEFT

## 2025-03-25 ASSESSMENT — PAIN DESCRIPTION - DESCRIPTORS: DESCRIPTORS: DISCOMFORT

## 2025-05-02 NOTE — ED PROVIDER NOTES
HPI:  3/25/25,   Time: 4:13 PM EDT         Jennifer Keller is a 49 y.o. female presenting to the ED for 1 week history of gradually worsening pain in both hands, the pain radiates up into the left shoulder on the left side moderate amount and mildly into the right shoulder on the right side.  She denies paresthesias.  She denies repetitive activities.  She does not work outside the home, having suffered a stroke about 11 years ago, which resolved and some weakness on the left side but she is able to function normally.  She helps take care of her mother who lives in her home.  She denied history of any repetitive work activities in the past.  She describes the pain as an aching type pain of moderate to moderately severe intensity in both hands.  She said is hard to make a fist because of the pain.        ROS:   Pertinent positives and negatives are stated within HPI, all other systems reviewed and are negative.  --------------------------------------------- PAST HISTORY ---------------------------------------------  Past Medical History:  has a past medical history of Chest pain, CVA (cerebral infarction), Hypercholesterolemia, Hypertension, and Migraine.    Past Surgical History:  has a past surgical history that includes Hysterectomy; Foot surgery (Right); Echo Complete (12/26/2013); ECHO Transesophageal (12/30/2013); transesophageal echocardiogram (12/30/2013); and Tubal ligation.    Social History:  reports that she has never smoked. She has never used smokeless tobacco. She reports that she does not drink alcohol and does not use drugs.    Family History: family history includes Heart Disease in her mother; Stroke in her mother.     The patient’s home medications have been reviewed.    Allergies: Patient has no known allergies.    -------------------------------------------------- RESULTS -------------------------------------------------  All laboratory and radiology results have been personally reviewed by myself  No